# Patient Record
Sex: FEMALE | Race: WHITE | URBAN - METROPOLITAN AREA
[De-identification: names, ages, dates, MRNs, and addresses within clinical notes are randomized per-mention and may not be internally consistent; named-entity substitution may affect disease eponyms.]

---

## 2018-12-07 ENCOUNTER — OFFICE VISIT (OUTPATIENT)
Dept: URBAN - METROPOLITAN AREA CLINIC 17 | Facility: CLINIC | Age: 63
End: 2018-12-07
Payer: COMMERCIAL

## 2018-12-07 DIAGNOSIS — T37.2X5S ADVERSE EFFECT OF ANTIMALARIAL, SEQUELA: ICD-10-CM

## 2018-12-07 DIAGNOSIS — Z79.899 OTHER LONG TERM (CURRENT) DRUG THERAPY: ICD-10-CM

## 2018-12-07 DIAGNOSIS — M06.89 OTHER SPECIFIED RHEUMATOID ARTHRITIS, MULTIPLE SITES: Primary | ICD-10-CM

## 2018-12-07 DIAGNOSIS — H25.13 AGE-RELATED NUCLEAR CATARACT, BILATERAL: ICD-10-CM

## 2018-12-07 PROCEDURE — 92014 COMPRE OPH EXAM EST PT 1/>: CPT | Performed by: OPTOMETRIST

## 2018-12-07 PROCEDURE — 92134 CPTRZ OPH DX IMG PST SGM RTA: CPT | Performed by: OPTOMETRIST

## 2018-12-07 ASSESSMENT — INTRAOCULAR PRESSURE
OD: 13
OS: 15

## 2018-12-07 NOTE — IMPRESSION/PLAN
Impression: Adverse effect of antimalarial, sequela: T37.2X5S.  Plan: No signs of hydroxychloroquine toxicity,

## 2018-12-07 NOTE — IMPRESSION/PLAN
Impression: Other long term (current) drug therapy: Z79.899.  Plan: No signs of hydroxychloroquine toxicity,

## 2018-12-07 NOTE — IMPRESSION/PLAN
Impression: Other specified rheumatoid arthritis, multiple sites: M06.89. Plan: No signs of hydroxychloroquine toxicity, Pt no longer taking Plaquenil.